# Patient Record
Sex: FEMALE | Race: WHITE | HISPANIC OR LATINO | Employment: FULL TIME | URBAN - METROPOLITAN AREA
[De-identification: names, ages, dates, MRNs, and addresses within clinical notes are randomized per-mention and may not be internally consistent; named-entity substitution may affect disease eponyms.]

---

## 2023-02-04 DIAGNOSIS — E11.65 TYPE 2 DIABETES MELLITUS WITH HYPERGLYCEMIA, WITH LONG-TERM CURRENT USE OF INSULIN (HCC): ICD-10-CM

## 2023-02-04 DIAGNOSIS — Z79.4 TYPE 2 DIABETES MELLITUS WITH HYPERGLYCEMIA, WITH LONG-TERM CURRENT USE OF INSULIN (HCC): ICD-10-CM

## 2023-02-04 DIAGNOSIS — I10 ESSENTIAL HYPERTENSION: ICD-10-CM

## 2023-02-06 NOTE — TELEPHONE ENCOUNTER
Can we reach out to [facility] and see if we can get blood sugar logs and schedule a  follow up  These medications were approved last month  Thank you!

## 2023-03-15 RX ORDER — METFORMIN HYDROCHLORIDE 500 MG/1
TABLET, EXTENDED RELEASE ORAL
Qty: 360 TABLET | Refills: 0 | Status: SHIPPED | OUTPATIENT
Start: 2023-03-15 | End: 2023-03-24 | Stop reason: SDUPTHER

## 2023-03-15 RX ORDER — AMLODIPINE BESYLATE 10 MG/1
TABLET ORAL
Qty: 90 TABLET | Refills: 0 | Status: SHIPPED | OUTPATIENT
Start: 2023-03-15

## 2023-03-24 ENCOUNTER — OFFICE VISIT (OUTPATIENT)
Dept: ENDOCRINOLOGY | Facility: CLINIC | Age: 53
End: 2023-03-24

## 2023-03-24 ENCOUNTER — TELEPHONE (OUTPATIENT)
Dept: ENDOCRINOLOGY | Facility: CLINIC | Age: 53
End: 2023-03-24

## 2023-03-24 VITALS
WEIGHT: 203.5 LBS | BODY MASS INDEX: 32.71 KG/M2 | SYSTOLIC BLOOD PRESSURE: 160 MMHG | HEIGHT: 66 IN | HEART RATE: 102 BPM | DIASTOLIC BLOOD PRESSURE: 80 MMHG

## 2023-03-24 DIAGNOSIS — I10 ESSENTIAL HYPERTENSION: ICD-10-CM

## 2023-03-24 DIAGNOSIS — Z79.4 TYPE 2 DIABETES MELLITUS WITH HYPERGLYCEMIA, WITH LONG-TERM CURRENT USE OF INSULIN (HCC): Primary | ICD-10-CM

## 2023-03-24 DIAGNOSIS — Z79.4 TYPE 2 DIABETES MELLITUS WITH STAGE 1 CHRONIC KIDNEY DISEASE, WITH LONG-TERM CURRENT USE OF INSULIN (HCC): ICD-10-CM

## 2023-03-24 DIAGNOSIS — E11.22 TYPE 2 DIABETES MELLITUS WITH STAGE 1 CHRONIC KIDNEY DISEASE, WITH LONG-TERM CURRENT USE OF INSULIN (HCC): ICD-10-CM

## 2023-03-24 DIAGNOSIS — N18.1 TYPE 2 DIABETES MELLITUS WITH STAGE 1 CHRONIC KIDNEY DISEASE, WITH LONG-TERM CURRENT USE OF INSULIN (HCC): ICD-10-CM

## 2023-03-24 DIAGNOSIS — E78.5 HYPERLIPIDEMIA, UNSPECIFIED HYPERLIPIDEMIA TYPE: ICD-10-CM

## 2023-03-24 DIAGNOSIS — E11.65 TYPE 2 DIABETES MELLITUS WITH HYPERGLYCEMIA, WITH LONG-TERM CURRENT USE OF INSULIN (HCC): Primary | ICD-10-CM

## 2023-03-24 RX ORDER — LANCETS
EACH MISCELLANEOUS
Qty: 100 EACH | Refills: 1 | Status: SHIPPED | OUTPATIENT
Start: 2023-03-24

## 2023-03-24 RX ORDER — INSULIN GLULISINE 100 [IU]/ML
INJECTION, SOLUTION SUBCUTANEOUS
Qty: 20 ML | Refills: 1 | Status: SHIPPED | OUTPATIENT
Start: 2023-03-24

## 2023-03-24 RX ORDER — ROSUVASTATIN CALCIUM 20 MG/1
20 TABLET, COATED ORAL DAILY
Qty: 30 TABLET | Refills: 6 | Status: SHIPPED | OUTPATIENT
Start: 2023-03-24

## 2023-03-24 RX ORDER — BLOOD SUGAR DIAGNOSTIC
STRIP MISCELLANEOUS
Qty: 100 EACH | Refills: 1 | Status: SHIPPED | OUTPATIENT
Start: 2023-03-24

## 2023-03-24 RX ORDER — INSULIN ASPART INJECTION 100 [IU]/ML
10 INJECTION, SOLUTION SUBCUTANEOUS
Qty: 10 ML | Refills: 0 | Status: SHIPPED | COMMUNITY
Start: 2023-03-24 | End: 2023-03-26

## 2023-03-24 RX ORDER — OMEGA-3-ACID ETHYL ESTERS 1 G/1
2 CAPSULE, LIQUID FILLED ORAL 2 TIMES DAILY
Qty: 120 CAPSULE | Refills: 6 | Status: SHIPPED | OUTPATIENT
Start: 2023-03-24

## 2023-03-24 RX ORDER — INSULIN DETEMIR 100 [IU]/ML
30 INJECTION, SOLUTION SUBCUTANEOUS DAILY
Qty: 10 ML | Refills: 2 | Status: SHIPPED | OUTPATIENT
Start: 2023-03-24

## 2023-03-24 RX ORDER — INSULIN GLARGINE 100 [IU]/ML
30 INJECTION, SOLUTION SUBCUTANEOUS DAILY
Qty: 3 ML | Refills: 0 | Status: SHIPPED | COMMUNITY
Start: 2023-03-24 | End: 2023-03-26

## 2023-03-24 RX ORDER — METFORMIN HYDROCHLORIDE 500 MG/1
1000 TABLET, EXTENDED RELEASE ORAL 2 TIMES DAILY WITH MEALS
Qty: 360 TABLET | Refills: 1 | Status: SHIPPED | OUTPATIENT
Start: 2023-03-24 | End: 2023-03-24 | Stop reason: DRUGHIGH

## 2023-03-24 RX ORDER — CALCIUM CARB/VITAMIN D3/VIT K1 500-100-40
TABLET,CHEWABLE ORAL
Qty: 120 EACH | Refills: 1 | Status: SHIPPED | OUTPATIENT
Start: 2023-03-24

## 2023-03-24 NOTE — TELEPHONE ENCOUNTER
Impression: Bilateral astigmatism: H52.203. Plan: Declined AMP, Discussed with patient that will need glasses after surgery. Discussed with patient, understands this may limit vision after surgery. Also discussed unmasking of astigmatism. Thank you!

## 2023-03-24 NOTE — TELEPHONE ENCOUNTER
Message was review with David Liang advised pt to take an extra 5 unit or her Fiasp,  Pt will need to inject 10 unit before dinner , told pt that she will need to wait at least 4 hours form the time that she take the extra 5 units  Advise pt to check her BS before meal and to please call us back on Monday with her reading  Also advise pt to call us anytime even on the weekend if her BS are higher

## 2023-03-24 NOTE — PROGRESS NOTES
Established Patient Progress Note    CC: Type 2 Diabetes Mellitus      History of Present Illness:   Severo Salvo is a 46 y o  female with a history of type 2 diabetes with long term use of insulin for the past 10 years  Reports complications of microalbuminuria, retinopathy, and toe amputation  Denies recent illness or hospitalizations  Used  and  present  She states that she did not have insulin scripts since last office visit; however, patient did not call to inform the office that home pharmacy changed and did not requests scripts to be transferred to home [facility]  Home glucose monitoring: are not performed  Current regimen: Not currently taking insulin or metformin     Metformin 1000 mg BID    Last Eye Exam: Needs eye exam  Last Foot Exam: Completed today    Has hypertension: Taking lisinopril and amlodipine  Has hyperlipidemia: Taking not currently taking     Patient Active Problem List   Diagnosis   • Type 2 diabetes mellitus with stage 1 chronic kidney disease, with long-term current use of insulin ([facility] [address] )   • Acquired absence of other right toe(s) ([facility] [address] )   • Essential hypertension   • Hyperlipidemia   • Hypomagnesemia   • Microalbuminuria   • Uncontrolled type 2 diabetes mellitus with hyperglycemia (HCC)   • Type 2 diabetes mellitus with hyperglycemia, with long-term current use of insulin (HCC)   • Type 2 diabetes mellitus with microalbuminuria, with long-term current use of insulin (HCC)   • Class 1 obesity due to excess calories with serious comorbidity and body mass index (BMI) of 34 0 to 34 9 in adult      Past Medical History:   Diagnosis Date   • DM type 2 (diabetes mellitus, type 2) ([facility] [address] )    • Hyperlipidemia    • Hypertension    • Microalbuminuria    • Obesity       Past Surgical History:   Procedure Laterality Date   • TOE AMPUTATION Right    • TUBAL LIGATION        Family History   Problem Relation Age of Onset   • Diabetes Family    • Hypertension Family "  • Hyperlipidemia Family    • Diabetes type II Mother    • Diabetes type II Sister      Social History     Tobacco Use   • Smoking status: Never   • Smokeless tobacco: Never   Substance Use Topics   • Alcohol use: No     Allergies   Allergen Reactions   • Ozempic (0 25 Or 0 5 Mg-Dose) [Semaglutide(0 25 Or 0 5mg-Dos)] GI Intolerance       Current Outpatient Medications:   •  Accu-Chek FastClix Lancets MISC, Test 3 times daily, Disp: 100 each, Rfl: 1  •  Accu-Chek Guide test strip, Test 3 times daily, Disp: 100 each, Rfl: 1  •  amLODIPine (NORVASC) 10 mg tablet, TAKE 1 TABLET BY MOUTH EVERY DAY, Disp: 90 tablet, Rfl: 0  •  RONAL LOW DOSE 81 MG EC tablet, TOME UNA TABLETA TODOS LOS DIAS, Disp: 30 tablet, Rfl: 3  •  Blood Glucose Monitoring Suppl (ACCU-CHEK GUIDE) w/Device KIT, Test 3 times daily, Disp: , Rfl:   •  fenofibrate (TRIGLIDE) 160 MG tablet, TOME UNA TABLETA TODOS LOS DIAS, Disp: 30 tablet, Rfl: 6  •  insulin detemir (Levemir) 100 units/mL subcutaneous injection, Inject 30 Units under the skin daily, Disp: 10 mL, Rfl: 2  •  insulin glulisine (Apidra) 100 units/mL injection, Use 10 units per meal three times per day , Disp: 20 mL, Rfl: 1  •  Insulin Syringe-Needle U-100 (INSULIN SYRINGE 1CC/31GX5/16\") 31G X 5/16\" 1 ML MISC, Use 4 times per day as directed, Disp: 120 each, Rfl: 1  •  lisinopril (ZESTRIL) 40 mg tablet, TOME UNA TABLETA TODOS LOS DIAS, Disp: 30 tablet, Rfl: 6  •  omega-3-acid ethyl esters (LOVAZA) 1 g capsule, Take 2 capsules (2 g total) by mouth 2 (two) times a day, Disp: 120 capsule, Rfl: 6  •  rosuvastatin (CRESTOR) 20 MG tablet, Take 1 tablet (20 mg total) by mouth daily, Disp: 30 tablet, Rfl: 6  •  hydrochlorothiazide (HYDRODIURIL) 25 mg tablet, Take 1 tablet (25 mg total) by mouth daily (Patient not taking: Reported on 3/24/2023), Disp: 30 tablet, Rfl: 6  •  insulin aspart, w/niacinamide, (Fiasp FlexTouch) 100 Units/mL injection pen, Inject 10 Units under the skin 3 (three) times a day " "before meals (Patient not taking: Reported on 3/24/2023), Disp: 15 mL, Rfl: 0  •  Insulin Aspart, w/Niacinamide, (Fiasp) 100 UNIT/ML SOLN, Inject 10 Units as directed 3 (three) times a day before meals for 2 days, Disp: 10 mL, Rfl: 0    Review of Systems   Constitutional: Negative for activity change, appetite change, fatigue and unexpected weight change  HENT: Negative for sore throat, trouble swallowing and voice change  Eyes: Positive for visual disturbance  Respiratory: Negative for cough and shortness of breath  Cardiovascular: Negative for chest pain and palpitations  Gastrointestinal: Negative for abdominal distention, abdominal pain, constipation, diarrhea, nausea and vomiting  Endocrine: Positive for polydipsia and polyuria  Negative for cold intolerance and heat intolerance  Skin: Negative for color change, pallor and rash  Neurological: Negative for dizziness, light-headedness and headaches  Physical Exam:  Body mass index is 32 85 kg/m²  /80 (BP Location: Left arm, Patient Position: Sitting, Cuff Size: Standard)   Pulse 102   Ht 5' 6\" (1 676 m)   Wt 92 3 kg (203 lb 8 oz)   BMI 32 85 kg/m²    Wt Readings from Last 3 Encounters:   03/24/23 92 3 kg (203 lb 8 oz)   11/07/22 88 3 kg (194 lb 9 6 oz)   12/16/21 95 8 kg (211 lb 4 oz)       Physical Exam  Cardiovascular:      Pulses: no weak pulses          Dorsalis pedis pulses are 2+ on the right side and 2+ on the left side  Feet:      Right foot:      Skin integrity: Callus present  No ulcer, skin breakdown, erythema, warmth or dry skin  Left foot:      Skin integrity: Callus present  No ulcer, skin breakdown, erythema, warmth or dry skin  Patient's shoes and socks removed  Right Foot/Ankle   Right Foot Inspection  Skin Exam: skin normal, skin intact, callus and callus  No dry skin, no warmth, no erythema, no maceration, no abnormal color, no pre-ulcer and no ulcer       Toe Exam: right toe deformity (R toe " 2nd toe amputation)  Sensory   Vibration: diminished  Monofilament testing: diminished    Vascular  Capillary refills: < 3 seconds  The right DP pulse is 2+  Left Foot/Ankle  Left Foot Inspection  Skin Exam: skin normal, skin intact and callus  No dry skin, no warmth, no erythema, no maceration, normal color, no pre-ulcer and no ulcer  Sensory   Vibration: diminished  Monofilament testing: diminished    Vascular  Capillary refills: < 3 seconds  The left DP pulse is 2+  Assign Risk Category  Deformity present  Loss of protective sensation  No weak pulses  Risk: 2      Labs:   Lab Results   Component Value Date    HGBA1C 15 0 (A) 11/07/2022    HGBA1C 15 (A) 12/16/2021    HGBA1C >14 0 (H) 05/13/2021     Lab Results   Component Value Date    CREATININE 0 96 05/13/2021    CREATININE 0 85 03/03/2020    CREATININE 0 69 01/20/2020    BUN 26 (H) 05/13/2021    K 4 6 05/13/2021    CL 91 (L) 05/13/2021    CO2 30 05/13/2021     eGFR   Date Value Ref Range Status   10/25/2018 107 ml/min/1 73sq m Final     Lab Results   Component Value Date    HDL 48 (L) 05/13/2021    TRIG 496 (H) 05/13/2021     Lab Results   Component Value Date    ALT 12 05/13/2021    AST 15 05/13/2021    ALKPHOS 116 05/13/2021     No results found for: [id number]  Lab Results   Component Value Date    FREET4 1 7 05/13/2021       Impression & Plan:    Problem List Items Addressed This Visit        Endocrine    Type 2 diabetes mellitus with stage 1 chronic kidney disease, with long-term current use of insulin (HCC) (Chronic)     No blood sugars available for review, glucose level in office was 477 mg/dL  She declined hemoglobin A1c requests lab work to be completed at the clinic covered by her insurance  She was provided a sample of fiasp and took 10 units while in the office  She was provided a sample of basaglar and provided instructions to take 30 units  Our office will call 2 hours after the appointment to check blood sugars    Reviewed "indications to proceed to the ER  BGL Reviewed: No blood sugars for review  Treatment regimen: Will hold on metformin until CMP completed  Fiasp 10 units TID  Basaglar 30 units once daily  Discussed risks/complications associated with uncontrolled diabetes including organ involvement, heart attack, stroke, death  Recommended referral to diabetes education  Advised lifestyle modifications including attention to diet including the amount and types of carbohydrates consumed and regular activity  Call for blood sugars less than 70 mg/dl or over 300 mg/dl  Monitor blood glucose levels at least 3 times a day, ideally before all insulin administration times  Discussed symptoms and treatment of hypoglycemia  Routine follow up for diabetic eye and foot exams  Ordered blood work to complete prior to next visit  Follow up in 2 weeks      Provided information for clinics in the area for primary care  Relevant Medications    Accu-Chek FastClix Lancets MISC    Accu-Chek Guide test strip    Insulin Syringe-Needle U-100 (INSULIN SYRINGE 1CC/31GX5/16\") 31G X 5/16\" 1 ML MISC    insulin glulisine (Apidra) 100 units/mL injection    insulin detemir (Levemir) 100 units/mL subcutaneous injection    Type 2 diabetes mellitus with hyperglycemia, with long-term current use of insulin ([facility]) - Primary    Relevant Medications    insulin glulisine (Apidra) 100 units/mL injection    insulin detemir (Levemir) 100 units/mL subcutaneous injection    Other Relevant Orders    Ambulatory Referral to Ophthalmology    Ambulatory referral to Podiatry    Comprehensive metabolic panel    Microalbumin / creatinine urine ratio    TSH, 3rd generation with Free T4 reflex       Cardiovascular and Mediastinum    Essential hypertension     BP elevated but decreased slightly during the course of the visit  Patient currently on lisinopril and amiodarone taking daily    Needs PCP follow up, provided information for the clinic to schedule " follow up  Provided indications to proceed to the ER  Denies chest pain, dizziness, shortness of breath, headaches at this time  Other    Hyperlipidemia     Currently not on therapy due to lack of refills  Will check lipid panel and restart therapy  Relevant Medications    rosuvastatin (CRESTOR) 20 MG tablet    omega-3-acid ethyl esters (LOVAZA) 1 g capsule    Other Relevant Orders    Lipid panel- Lab Collect       Discussed with the patient and all questioned fully answered  She will call me if any problems arise  Follow-up appointment in 2 weeks       Counseled patient on diagnostic results, prognosis, risk and benefit of treatment options, instruction for management, importance of treatment compliance, Risk  factor reduction and impressions    MILA Bailey

## 2023-03-24 NOTE — TELEPHONE ENCOUNTER
Called pt  To f/u , Per pt she did check her BS at 12:30 pm and her BS was 345  Pt called Payton Castillo to schedule an appointment , no one answer but her spouse is calling his insurance to see what PCP is on her Insurance network

## 2023-03-24 NOTE — LETTER
[address] [place] [address] [address] [address]  Phone#  [phone]  Fax#  [phone]      April 4, 2023       [facility]    Hemos estado tratando de comunicarnos  Muchas veces Con usted , tambien llamamos al telefono de orona esposo y hemos dejado mucho mensajes de voz, le estuvimos llamando para saber zuleika sigue con jaskaran myke del azucar y para asegurarnos que esta usando [facility]  [place] favor llamenos al [phone]        Sinceramente     Alexandra Evans

## 2023-03-27 NOTE — ASSESSMENT & PLAN NOTE
No blood sugars available for review, glucose level in office was 477 mg/dL  She declined hemoglobin A1c requests lab work to be completed at the clinic covered by her insurance  She was provided a sample of fiasp and took 10 units while in the office  She was provided a sample of basaglar and provided instructions to take 30 units  Our office will call 2 hours after the appointment to check blood sugars  Reviewed indications to proceed to the ER  BGL Reviewed: No blood sugars for review  Treatment regimen: Will hold on metformin until CMP completed  Fiasp 10 units TID  Basaglar 30 units once daily  Discussed risks/complications associated with uncontrolled diabetes including organ involvement, heart attack, stroke, death  Recommended referral to diabetes education  Advised lifestyle modifications including attention to diet including the amount and types of carbohydrates consumed and regular activity  Call for blood sugars less than 70 mg/dl or over 300 mg/dl  Monitor blood glucose levels at least 3 times a day, ideally before all insulin administration times  Discussed symptoms and treatment of hypoglycemia  Routine follow up for diabetic eye and foot exams  Ordered blood work to complete prior to next visit  Follow up in 2 weeks      Provided information for clinics in the area for primary care

## 2023-03-27 NOTE — ASSESSMENT & PLAN NOTE
BP elevated but decreased slightly during the course of the visit  Patient currently on lisinopril and amiodarone taking daily  Needs PCP follow up, provided information for the clinic to schedule follow up  Provided indications to proceed to the ER  Denies chest pain, dizziness, shortness of breath, headaches at this time

## 2023-03-28 ENCOUNTER — TELEPHONE (OUTPATIENT)
Dept: ENDOCRINOLOGY | Facility: CLINIC | Age: 53
End: 2023-03-28

## 2023-03-28 NOTE — TELEPHONE ENCOUNTER
Mellissa Berkowitz Case ID: [id number] - Rx #: [id number]  Need help? Call us at [phone]  Status   Sent to [employer]  Drug    Apidra 100UNIT/ML solution   Form    [facility] Electronic Prior Authorization Form ([employer]) 2017 NCPDP           Original Claim Info    75 [place] RQRD  CALL [phone] RQRD   CALL [phone] LIMITATIONS EXCEEDEDTRANSMISSION FEE UP TO $0 31 MAY APPLY

## 2023-12-08 NOTE — TELEPHONE ENCOUNTER
2023       Alber Christensen MD  [address] [facility] [address]  Via In Basket      Patient: Kirsten Salvador   YOB: 1958   Date of Visit: 2023       Dear Dr. Christensen:    I saw your patient, Alice Salvador, for an evaluation. Below are my notes for this visit with her.    If you have questions, please do not hesitate to call me.      Sincerely,        Mariya Tompkins CNP        CC: No Recipients  Mariya Amaro CNP  2023  9:07 AM  Signed  RHEUMATOLOGY FOLLOW UP VISIT    The  [employer] C[employer] Act makes medical notes like these available to patients in the interest of transparency.  Please be advised that this is a medical document.  Medical documents are intended to carry relevant information , facts as evident, and the clinical opinion of the practitioner.  The medical note is intended as [id number] communication and may appear blunt or direct.  It is written in medical language and may contain abbreviations or verbiage that are unfamiliar.     Name: Kirsten Salvador  : 1958     Referred by: Alber Christensen MD    Chief Complaint   Patient presents with   • Rheumatoid Arthritis     4 month follow up. Pain in back, legs 5/10.       HISTORY OF PRESENT ILLNESS    This is a very pleasant  65 year old femalehere today for follow up  for  inflammatory / seronegative RA and fibromyalgia .  She has history of anxiety , hypertension, varicose veins of legs, lymphocytic plasmacytic colitis , arthralgia, dermatitis, hyperlipidemia, goiter , and history of shingles.  She has degenerative disc disease of lumbar spine and had been under PT for this . She works for [employer] . She had COVID in September       On past visit I had ordered a MRI of her bilateral hands which revealed synovitis at the MCP and PIP joints of the index finger , there was also ? Mild tenosynovitis as well. This was consistent with diagnosis of seronegative  ABHISHEK A approved    Letter faxed to Pt's pharmacy rheumatoid arthritis       Past medications: hydroxychloroquine ( caused a rash on hands and arms with a sore on her lip)   , duloxetine (intolerable side effects of dizziness, light headed , weak and arms/ hands were shaking uncontrollably , diarrhea)       Past treatments: Recent right trochanteric bursa injection by orthopedics ( efficacious)      She had a MRI for back pain and it revealed severe neural foraminal stenoses along with multi level degenerative disc disease of lumbar spine  , she is under care of orthopedic Dr Lopez. She is currently planning on trying physical therapy and epidural injections .  She had a  hemorrhoidectomy this year   for significant hemorrhoids with recent hemorrhoid tear and pus drainage , she did have some post operative bleeding post surgery which  resolved         She had COVID vaccine x 3 Pfizer      She had recent work up , her CMP revealed good kidney and liver function tests , her ESR was normal at 11 mm/hr, and her CBC reveals normal WBC and platelet count and no anemia on lab      She is on methotrexate 12.5  mg weekly and folic acid 2 mg daily    She has back pain and pain down her legs , she has not been to see Dr Lopez for her back since last year .  She had COVID in September and did go on Paxlovid for treatment she also had RSV right after COVID     REVIEW OF SYSTEMS  Constitutional: no fevers or chills no weight loss  EYES: + dry eyes, no blurred vision, no diplopia, no history of iritis  ENT:  + dry mouth,no oral or nasal ulcers , no dysphagia, no chronic sinus disease   Cardiovascular: no chest pain orthopnea no PND no palpitations  Respiratory: no cough no shortness of breath no wheezing no stridor no dyspnea on exertion  GI: no nausea, no vomiting, no diarrhea, no constipation no heartburn, no abdominal pain, Bloating , early satiety , had work up nothing found   :no dysuria urgency no hematuria  FREDI: as above hx  raynauds  Heme:no history of deep venous  thrombosis or pulmonary embolism no anemia, no swollen glands  CNS: no parasthesias,no weakness, no ataxia, no tremors, no dizziness  PSY :+  anxiety or depression  INTEGUMENTARY: no hair loss, no rashes  ENDO: No polyuria, no polydipsia, not cushingoid    Past Medical History:   Diagnosis Date   • Actinic keratosis    • Anxiety    • Arthritis    • Arthritis, rheumatoid (CMD)    • Back pain    • Chronic rhinitis    • Constipation    • Eczema    • Essential (primary) hypertension    • External hemorrhoids    • Fracture, foot     right anterior process fx   • GERD (gastroesophageal reflux disease)    • History of COVID-19    • hx of Davila's neuroma of right foot    • Hyperlipidemia    • Irritable bowel    • Lymphocytic colitis    • Osteopenia    • Recent change in frequency of bowel movements    • Rectal bleeding    • Ulcerative (chronic) ileocolitis (CMD)         Past Surgical History:   Procedure Laterality Date   • Colonoscopy     • Esophagogastroduodenoscopy transoral flex w/bx single or mult     • Hemorhoidectomy int ext single column group  2022   • Hernia repair     • Tubal ligation         Social History     Tobacco Use   • Smoking status: Former     Current packs/day: 0.00     Types: Cigarettes     Quit date:      Years since quittin.9   • Smokeless tobacco: Never   Vaping Use   • Vaping Use: never used   Substance Use Topics   • Alcohol use: Yes     Comment: social    • Drug use: No       Current Outpatient Medications   Medication Sig Dispense Refill   • lidocaine (LIDODERM) 5 % patch Place 2 patches onto the skin every 24 hours. Remove patch 12 hours after applying 60 patch 5   • methotrexate (RHEUMATREX) 2.5 MG tablet Take 5 tablets by mouth every 7 days. 65 tablet 0   • folic acid (FOLATE) 1 MG tablet TAKE 2 TABLETS BY MOUTH DAILY 180 tablet 3   • sertraline (ZOLOFT) 50 MG tablet Take 1 tablet by mouth daily. 90 tablet 3   • fluticasone (FLONASE) 50 MCG/ACT nasal spray Spray 2  sprays in each nostril daily. 48 g 1   • pravastatin (PRAVACHOL) 20 MG tablet Take 1 tablet by mouth at bedtime. 90 tablet 1   • nirmatrelvir & ritonavir 300mg/100mg (PAXLOVID) 20 x 150 MG & 10 x 100MG Take 300 mg nirmatrelvir (two 150 mg tablets) with 100 mg ritonavir (one 100 mg tablet), with all three tablets taken together by mouth twice daily for 5 days. 30 each 0   • methylPREDNISolone (MEDROL DOSEPAK) 4 MG tablet Take 1 tablet by mouth as directed. follow package directions 21 tablet 0   • meclizine (ANTIVERT) 25 MG tablet Take 1 tablet by mouth 3 times daily as needed for Dizziness. 90 tablet 0   • gabapentin (NEURONTIN) 100 MG capsule Take 1 capsule by mouth 4 times daily. 360 capsule 0   • estrogens conjugated (Premarin) vaginal cream Insert 0.5 gm vaginally every night for 2 weeks, then twice a week thereafter 30 g 3   • enalapril (VASOTEC) 5 MG tablet Take 1 tablet by mouth daily. 90 tablet 3   • triamterene-hydroCHLOROthiazide (MAXZIDE-25) 37.5-25 MG per tablet Take 1 tablet by mouth daily. 90 tablet 3   • famotidine (PEPCID) 40 MG tablet Take 1 tablet by mouth daily before dinner as needed to prevent acid reflux. 90 tablet 3   • pantoprazole (PROTONIX) 20 MG tablet Take 1 tablet by mouth daily. 90 tablet 3   • acetaminophen (TYLENOL) 650 MG CR tablet Take 650 mg by mouth every 8 hours as needed for Pain.     • Cholecalciferol (VITAMIN D) 2000 units tablet Take 6,000 Units by mouth daily.       No current facility-administered medications for this visit.           PHYSICAL EXAM    Vitals:    12/08/23 0743   BP: 130/82   BP Location: RUE - Right upper extremity   Patient Position: Sitting   Cuff Size: Regular   Pulse: 67   Resp: 18   Temp: 97.8 °F (36.6 °C)   TempSrc: Oral   SpO2: 97%   Weight: 72.8 kg (160 lb 7.9 oz)   Height: 5' 4\" (1.626 m)   PainSc: 5    PainLoc: Back     Body mass index is 27.55 kg/m².     Skin: no ulcers, no malar rash, no petechia no purpura.    Head and Face: Atraumatic no  deformities normocephalic normal facies  Eyes: Pink conjunctiva, anicteric sclera, no periorbital swelling, no ptosis, pupils reactive to light, extraocular muscles intact.  + dry eyes.    ENT: No  sinus tenderness, no malar rash, no temporal artery tenderness,   No TMJ tenderness     Neck: Fairly good range of motion of C-spine, no paracervical tenderness, no goiter, no adenopathy, no supraclavicular masses.    Cardiac exam: S1-S2 regular, no murmurs.    Lungs: clear, no rales or wheezing, no abnormal breath sounds, good breath sounds bilaterally.    Abdomen: no hepatomegaly or splenomegaly or tenderness, no masses, no ascites.    Back: no SI joint tenderness, no paralumbar tenderness,   Musculoskeletal exam:  Good range of motion bilateral shoulder joint with no tenderness  Bilateral elbows no synovitis or tenderness  Bilateral wrist joints no synovitis or tenderness  Bilateral MCP joints no synovitis or tenderness  Bilateral PIP joints no synovitis and tenderness  Bilateral DIP joints no synovitis or tenderness  Both knees no effusion warmth or tenderness no knee instability  Both ankles no synovitis or tenderness  Bilateral MTP joints no synovitis or tenderness no dactylitis  Good range of motion bilateral hip joints with no tenderness    Neurological exam: Normal gait, normal motor strength in upper and lower extremity, normal muscle tone, no tremors alert oriented x3.  good bilateral hand , no muscle atrophy.  Tender joints =0  Swollen joints =0  ESR= 11 mm/hr   MCPHERSON-28 score = 1.68 indicates remission     LABS  Lab Services on 12/04/2023   Component Date Value Ref Range Status   • Fasting Status 12/04/2023 8  0 - 999 Hours Final   • Sodium 12/04/2023 138  135 - 145 mmol/L Final   • Potassium 12/04/2023 3.6  3.4 - 5.1 mmol/L Final   • Chloride 12/04/2023 105  97 - 110 mmol/L Final   • Carbon Dioxide 12/04/2023 26  21 - 32 mmol/L Final   • Anion Gap 12/04/2023 11  7 - 19 mmol/L Final   • Glucose 12/04/2023  79  70 - 99 mg/dL Final   • BUN 12/04/2023 18  6 - 20 mg/dL Final   • Creatinine 12/04/2023 0.68  0.51 - 0.95 mg/dL Final   • Glomerular Filtration Rate 12/04/2023 >90  >=60 Final    eGFR results = or >60 mL/min/1.73m2 = Normal kidney function. Estimated GFR calculated using the CKD-EPI-R (2021) equation that does not include race in the creatinine calculation.   • BUN/Cr 12/04/2023 26 (H)  7 - 25 Final   • Calcium 12/04/2023 9.3  8.4 - 10.2 mg/dL Final   • Bilirubin, Total 12/04/2023 0.5  0.2 - 1.0 mg/dL Final   • GOT/AST 12/04/2023 21  <=37 Units/L Final   • GPT/ALT 12/04/2023 28  <64 Units/L Final   • Alkaline Phosphatase 12/04/2023 64  45 - 117 Units/L Final   • Albumin 12/04/2023 3.7  3.6 - 5.1 g/dL Final   • Protein, Total 12/04/2023 6.6  6.4 - 8.2 g/dL Final   • Globulin 12/04/2023 2.9  2.0 - 4.0 g/dL Final   • A/G Ratio 12/04/2023 1.3  1.0 - 2.4 Final   • RBC Sedimentation Rate 12/04/2023 11  0 - 20 mm/hr Final   • C-Reactive Protein 12/04/2023 <0.3  <=1.0 mg/dL Final   • WBC 12/04/2023 5.8  4.2 - 11.0 K/mcL Final   • RBC 12/04/2023 4.14  4.00 - 5.20 mil/mcL Final   • HGB 12/04/2023 13.6  12.0 - 15.5 g/dL Final   • HCT 12/04/2023 40.3  36.0 - 46.5 % Final   • MCV 12/04/2023 97.3  78.0 - 100.0 fl Final   • MCH 12/04/2023 32.9  26.0 - 34.0 pg Final   • MCHC 12/04/2023 33.7  32.0 - 36.5 g/dL Final   • RDW-CV 12/04/2023 13.2  11.0 - 15.0 % Final   • RDW-SD 12/04/2023 46.8  39.0 - 50.0 fL Final   • PLT 12/04/2023 269  140 - 450 K/mcL Final   • NRBC 12/04/2023 0  <=0 /100 WBC Final   • Neutrophil, Percent 12/04/2023 63  % Final   • Lymphocytes, Percent 12/04/2023 28  % Final   • Mono, Percent 12/04/2023 7  % Final   • Eosinophils, Percent 12/04/2023 1  % Final   • Basophils, Percent 12/04/2023 1  % Final   • Immature Granulocytes 12/04/2023 0  % Final   • Absolute Neutrophils 12/04/2023 3.6  1.8 - 7.7 K/mcL Final   • Absolute Lymphocytes 12/04/2023 1.6  1.0 - 4.0 K/mcL Final   • Absolute Monocytes 12/04/2023 0.4   0.3 - 0.9 K/mcL Final   • Absolute Eosinophils  12/04/2023 0.1  0.0 - 0.5 K/mcL Final   • Absolute Basophils 12/04/2023 0.0  0.0 - 0.3 K/mcL Final   • Absolute Immature Granulocytes 12/04/2023 0.0  0.0 - 0.2 K/mcL Final             ASSESSMENT  Impression: Very pleasant 65 year old female here today for follow up for seronegative RA doing well as far as her RA goes. Her back bothers her chronically along with sciatica, I discussed with her that she should follow back up with Dr Lopez and pursue epidural injections .  I will place labs for prior to next visit and continue her on her current medication regimen   Problem List Items Addressed This Visit    None  Visit Diagnoses       Seronegative rheumatoid arthritis (CMD)    -  Primary    Relevant Medications    lidocaine (LIDODERM) 5 % patch    methotrexate (RHEUMATREX) 2.5 MG tablet    folic acid (FOLATE) 1 MG tablet    Other Relevant Orders    CBC with Automated Differential    Comprehensive Metabolic Panel    Sedimentation Rate    C Reactive Protein    High risk medication use        Relevant Medications    folic acid (FOLATE) 1 MG tablet    Other Relevant Orders    CBC with Automated Differential    Comprehensive Metabolic Panel    Sedimentation Rate    C Reactive Protein    DDD (degenerative disc disease), lumbar        Relevant Medications    lidocaine (LIDODERM) 5 % patch    Other Relevant Orders    Comprehensive Metabolic Panel    Fibromyalgia        Relevant Orders    Comprehensive Metabolic Panel           PLAN  Orders Placed This Encounter   • CBC with Automated Differential   • Comprehensive Metabolic Panel   • Sedimentation Rate   • C Reactive Protein   • DISCONTD: methotrexate (RHEUMATREX) 2.5 MG tablet   • DISCONTD: folic acid (FOLATE) 1 MG tablet   • lidocaine (LIDODERM) 5 % patch   • methotrexate (RHEUMATREX) 2.5 MG tablet   • folic acid (FOLATE) 1 MG tablet     Labs placed for CBC, CMP, ESR , and C-RP     Continue methotrexate 12.5 mg weekly for RA      Continue folic acid 2 mg daily for RA     Continue lidocaine patches 5 % , may apply two daily PRN for pain for DDD     Continue gabapentin 100 mg QID ( for fibromyalgia / DDD )     Risks of medical conditions and side effects of medication were discussed.  Medical compliance with plan discussed and risks of non-compliance reviewed.    Patient education completed on disease process, etiology & prognosis.    Patient expresses understanding of the plan.    Return to clinic in four months  as clinically indicated as discussed with patient who verbalized understanding of & agreement with the plan.       Mariya Tompkins, CNP